# Patient Record
Sex: MALE | Race: WHITE | NOT HISPANIC OR LATINO | Employment: UNEMPLOYED | URBAN - METROPOLITAN AREA
[De-identification: names, ages, dates, MRNs, and addresses within clinical notes are randomized per-mention and may not be internally consistent; named-entity substitution may affect disease eponyms.]

---

## 2017-07-06 ENCOUNTER — HISTORICAL (OUTPATIENT)
Dept: ADMINISTRATIVE | Facility: HOSPITAL | Age: 27
End: 2017-07-06

## 2020-10-12 ENCOUNTER — HISTORICAL (OUTPATIENT)
Dept: ADMINISTRATIVE | Facility: HOSPITAL | Age: 30
End: 2020-10-12

## 2020-10-14 LAB — FINAL CULTURE: NO GROWTH

## 2020-10-16 ENCOUNTER — HISTORICAL (OUTPATIENT)
Dept: ADMINISTRATIVE | Facility: HOSPITAL | Age: 30
End: 2020-10-16

## 2020-10-17 LAB — FINAL CULTURE: NORMAL

## 2020-10-18 LAB — FINAL CULTURE: NORMAL

## 2021-09-24 PROBLEM — R19.7 DIARRHEA: Status: ACTIVE | Noted: 2021-09-24

## 2022-04-08 PROBLEM — F31.4 BIPOLAR I DISORDER, MOST RECENT EPISODE DEPRESSED, SEVERE WITHOUT PSYCHOTIC FEATURES: Status: ACTIVE | Noted: 2022-04-08

## 2022-04-08 PROBLEM — F15.90 STIMULANT USE DISORDER: Status: ACTIVE | Noted: 2022-04-08

## 2022-04-09 PROBLEM — R76.8 HEPATITIS B SURFACE ANTIGEN POSITIVE: Status: ACTIVE | Noted: 2022-04-09

## 2022-04-30 NOTE — ED PROVIDER NOTES
Patient:   Kurt Leone            MRN: [id number]            FIN: [id number]               Age:   30 years     Sex:  Male     :  1990   Associated Diagnoses:   Closed head injury; Forehead laceration; Laceration of left eyebrow   Author:   Omayra Vasquez MD      Basic Information   Time seen: Date & time 10/16/2020 14:04:00.   History source: Patient, EMS.   Arrival mode: Ambulance.   History limitation: None.   Additional information: Chief Complaint from Nursing Triage Note : Chief Complaint   10/16/2020 14:01 CDT     Chief Complaint           trauma level two, see trauma flowsheet.  .      History of Present Illness   The patient presents with   30 year old male presents to ED via EMS as Level 2 Trauma after he was struck in the head by a branch. Patient was cutting tree branches when a branch swung and struck him in the head and he was knocked off of landing approximately 5 to 10 feet high. Initial GCS 14. Patient was repetitive and disoriented to the month..  The onset was just prior to arrival.  The course of symptoms is constant.  Type of injury: struck in the head by a branch and knocked off of landing approximately 5-10 feet high.  The location where the incident occurred was at home.  Location: Anterior head face. The character of symptoms is pain and bleeding.  The degree of bleeding is minimal.  The degree of pain is moderate.  The exacerbating factor is none.  The relieving factor is none.  Risk factors consist of none.  Therapy today: emergency medical services.  Associated symptoms: denies altered level of consciousness.  Additional history: none.        Review of Systems   Constitutional symptoms:  No fever, no chills   Skin symptoms:  No abrasions   Eye symptoms:  Vision unchangedNo pain, no blurred vision   ENMT symptoms:  No epistaxisNo ear pain   Respiratory symptoms:  No shortness of breath, no hemoptysis   Cardiovascular symptoms:  No chest pain, no syncope   Gastrointestinal  symptoms:  No abdominal pain, no nausea, no vomiting   Musculoskeletal symptoms:  No back pain, no Muscle pain   Neurologic symptoms:  No headache, no dizziness, no numbness, no tingling, no weakness.    Hematologic/Lymphatic symptoms:  No anticoagulant useBleeding tendency negative, bruising tendency negative.              Additional review of systems information: All other systems reviewed and otherwise negative.      Health Status   Allergies:    Allergic Reactions (Selected)  No Known Allergies  No Known Medication Allergies.   Medications: Per nurse's notes.   Immunizations: Tetanus: Less than 5 years ago.      Past Medical/ Family/ Social History   Medical history:    Active  Bipolar ([id number])  Depression ([id number]),   Anxiety   Bipolar   Depression   Major depression   Methamphetamine abuse   PTSD - Post-traumatic stress disorder .   Surgical history:    facial recontruction in 2008 at 18 Years.  OD - right eye ([id number])..   Family history:    History is unknown..   Social history:    Social & Psychosocial Habits    Alcohol  07/17/2015  Use: Never    Home/Environment  07/06/2017  Living situation: Home/Independent    Substance Use  08/15/2019  Use: Current    Type: Amphetamines, Marijuana, Methamphetamines    Frequency: Daily    Has drug use interfered with your work or home life? Yes    Concerns about substance abuse in household: Yes    09/05/2018  Type: Prescription medications    06/24/2019  Type: Marijuana    07/17/2015  Use: Past    Tobacco  08/15/2019  Use: 5-9 cigarettes (between 1    Patient Wants Consult For Cessation Counseling No    Concerns about tobacco use in household: No    Smokeless tobacco use: Never    07/17/2015  Use: Current every day smoker    Type: Cigarettes    Tobacco use per day: 20    10/12/2020  Use: 10 or more cigarettes (1/    Patient Wants Consult For Cessation Counseling No    Abuse/Neglect  08/15/2019  SHX Any signs of abuse or neglect No    Feels unsafe at home: No     Safe place to go: No    10/12/2020  SHX Any signs of abuse or neglect No  , Alcohol use: Denies, Tobacco use: Regularly.      Physical Examination               Vital Signs   Oxygen saturation.   General:  Alert, no acute distress   Wilton coma scale:  Eye response: 4 /4, verbal response: 5 /5, motor response: 6 /6, Total score: Total score: 15.    Neurological:  Alert and oriented to person, place, time, and situation, No focal neurological deficit observed, normal sensory observed, normal motor observed, normal speech observed.    Skin:  Warm, dry   Head:  Normocephalic, superficial laceration 1.5 cm midline forehead; additional superficial laceration L forehead; multiple contusions; no active bleeding; 3 cm periorbital laceration   Neck:  Trachea midline, no tenderness, no JVD   Eye:  Extraocular movements are intact, normal conjunctiva, vision grossly normal, no hyphema, Pupil: Reactivity brisk to direct light, 4mm to 2mm.    Ears, nose, mouth and throat:  Tympanic membranes clear, oral mucosa moist, No hemotympanum, no garcia sign or periorbital ecchymosis, nasal septum straight w/o septal hematoma or epistaxis, midface stable without tenderness to palpation, no dental fractures or malalignment, no oral mucosal lacerations   Cardiovascular:  Regular rate and rhythm, Normal peripheral perfusion, Arterial pulses: Bilateral, upper extremity, lower extremity, 2+.    Respiratory:  Lungs are clear to auscultation, respirations are non-labored, breath sounds are equal, Symmetrical chest wall expansion.    Chest wall:  No tenderness, No deformity, No abrasions or contusions.    Back:  no midline tenderness to cervical/ lumbar/thoracic, No midline cervical/thoracic/lumbosacral tenderness or stepoff deformity, no paraspinal tenderness   Musculoskeletal:  Normal ROM, normal strength, no tenderness, no deformity.    Gastrointestinal:  Soft, Nontender, Non distended      Medical Decision Making   Rationale:  Patient with  "head injury from falling branch. No intracranial hemorrhage or calvarial fx on imaging. Lacerations repaired as below. ED return precautions discussed with patient at the bedside and provided in the printed discharge instructions. All questions answered. Patient should follow up with primary care/ED for suture removal. .   Documents reviewed:  Emergency department nurses' notes.   Orders  Launch Order Profile (Selected)   Inpatient Orders  Ordered  30 Day Readmission: 10/16/20 14:32:56 CDT, Stop date 10/16/20 14:32:56 CDT, "This patient has had an inpatient, observation, outpatient bedded or emergency visit within the last 30 days."  HT Screening: 10/16/20 14:01:44 CDT  Ice Pack Apply: 10/16/20 14:09:00 CDT, Stop date 10/16/20 14:09:00 CDT, 10/16/20 14:09:00 CDT  Completed  CT Head W/O Contrast: Stat, 10/16/20 14:08:00 CDT, Trauma, None, Ambulatory, Rad Type, Schedule this test, 10/16/20 14:08:00 CDT  CT Maxillofacial W/O Contrast: Stat, 10/16/20 14:08:00 CDT, Trauma, None, Ambulatory, Rad Type, Schedule this test, 10/16/20 14:08:00 CDT  lidocaine 1% MDV 20ml inj: 20 mL, Injection, N/A, Once, Stop date 10/16/20 14:27:10 CDT, Physician Stop, 10/16/20 14:27:10 CDT.   Results review:   Radiology results:  Rad Results (ST)  < 12 hrs   Accession: [id number]  Order: CT Maxillofacial W/O Contrast  Report Dt/Tm: 10/16/2020 14:48  Report:   EXAM: CT Maxillofacial W/O Contrast     INDICATION: Trauma     COMPARISON: None     TECHNIQUE: Volumetric CT acquisition of the facial bones without  contrast. Axial, coronal and sagittal reconstructions.   DLP: 853 mGy-cm     FINDINGS:      There has been prior internal fixation of the right orbit and maxilla.  There is no acute fracture identified.     The paranasal sinuses and mastoid air cells are clear.     There is mild left periorbital edema with punctate radiopaque  densities. The orbits are unremarkable.     IMPRESSION:   No acute fracture identified.    Accession: " [id number]  Order: CT Head W/O Contrast  Report Dt/Tm: 10/16/2020 14:44  Report:   EXAM: CT HEAD WITHOUT CONTRAST     History: Trauma     Comparison studies: None     Technique:   Axial scans were obtained from skull base to the vertex.  Coronal and sagittal reconstructions obtained from the axial data.   Automatic exposure control was utilized to limit radiation dose.  Contrast: None     Radiation Dose:  Total DLP: 1922 mGy*cm     DISCUSSION:     There is no acute intracranial hemorrhage or edema. The gray-white  matter differentiation is preserved.     There is no mass effect or midline shift. The ventricles and sulci are  normal in size. The basal cisterns are patent. There is no abnormal  extra-axial fluid collection.     The calvarium and skull base are intact. The visualized paranasal  sinuses and the mastoid air cells are clear.        IMPRESSION:  No acute intracranial abnormality.    .      Procedure   Laceration repair   Confirmed: Patient, procedure, side, and site correct.    Consent: Patient, Has given verbal consent.       Description/ repair   Laceration  1 cm in length.Face: forehead.   Shape: linear.   Depth: superficial.   Details: surrounding tissue contused, swelling.   Neurovascular/ tendon exam: intact.   Anesthesia: 1 ml, 1% lidocaine.   Preparation: skin prepped with soap.   Irrigation: with saline.   Debridement: none.   Skin closure: # 2 sutures, with 5 -0 Prolene, simple technique, interrupted technique.   Hemostasis: intact.   Complexity: single layer.      Description/ repair   Laceration  3 cm in length.Face: left, eyebrow.   Shape: linear.   Depth: subcutaneous.   Details: surrounding tissue contused, swelling.   Neurovascular/ tendon exam: intact.   Anesthesia: 5 ml, 1% lidocaine.   Preparation: skin prepped with soap.   Irrigation: copious, with saline.   Debridement: none.   Skin closure: # 4 sutures, with 5 -0 Prolene, simple technique, interrupted technique.   Subcutaneous  closure: # 3 sutures, with 4 -0Vicryl, simple technique, interrupted technique.   Hemostasis: intact.   Complexity: 2 layers.   Post procedure exam: Circulation, motor, sensory examination intact.    Complications: None.    Patient tolerated: Well.    Performed by: Self.    Total time: 15 minutes.       Impression and Plan   Diagnosis   Closed head injury (SRL85-DB S09.90XA)   Forehead laceration (SXB87-YM S01.81XA)   Laceration of left eyebrow (OHZ91-MW S01.112A)   Plan   Condition: Improved.    Disposition: Discharged: Time  10/16/2020 16:23:00, to home.    Patient was given the following educational materials: Head Injury, Adult, MRN Facial Laceration Care, Adult (MRNAQUIN).    Follow up with: Report to Emergency Department if symptoms return or worsen Within 3 to 5 days Return to ED for suture removal.    Counseled: Patient, Regarding diagnosis, Regarding diagnostic results, Regarding treatment plan, Patient indicated understanding of instructions.    Notes: IMitesh, acted solely as a scribe for and in the presence of Dr. Vasquez who performed the service., I, Omayra Vasquez, have independently performed the history, physical, medical decision making and procedures as documented above and agree with the scribe's documentation. .
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
